# Patient Record
Sex: MALE | Race: BLACK OR AFRICAN AMERICAN | Employment: FULL TIME | URBAN - METROPOLITAN AREA
[De-identification: names, ages, dates, MRNs, and addresses within clinical notes are randomized per-mention and may not be internally consistent; named-entity substitution may affect disease eponyms.]

---

## 2019-10-14 ENCOUNTER — TELEPHONE (OUTPATIENT)
Dept: UROLOGY | Facility: CLINIC | Age: 34
End: 2019-10-14

## 2019-10-14 NOTE — TELEPHONE ENCOUNTER
[facility] Call Center    Phone Message    May a detailed message be left on voicemail: yes    Reason for Call: Order(s): Other:   Reason for requested: semen analysis  Date needed: asap  Provider name: either leland or mayo      Action Taken: Message routed to:  [facility] ([facility]): alejandra uro

## 2019-10-18 NOTE — TELEPHONE ENCOUNTER
MEDICAL RECORDS REQUEST   [facility]  Urology Clinic  [address] [place] [address]  PHONE: [phone]  Fax: [phone]        FUTURE VISIT INFORMATION                                                   Alon Jaime : 1985 scheduled for future visit at [facility]    APPOINTMENT INFORMATION:    Date: 19 8:40AM     Provider:  Valente Willson MD     Reason for Visit/Diagnosis: Infertility     REFERRAL INFORMATION:    Referring provider: Self    Specialty: N/A    Referring providers clinic: N/A     Clinic contact number:  N/A    RECORDS REQUESTED FOR VISIT                                                     NOTES  STATUS/DETAILS   OFFICE NOTE from referring provider  no   OFFICE NOTE from other specialist  no   DISCHARGE SUMMARY from hospital  no   DISCHARGE REPORT from the ER  no   OPERATIVE REPORT  no   MEDICATION LIST  no   INFERTILITY     ALBUMIN  no   FSH  no   LAST UROLOGY/OB GYN VISIT NOTE  no   LH  no   SEMEN ANALYSIS (LAST 2)  in process - Order in [facility], to be brenda    SHBG  no   T  no     PRE-VISIT CHECKLIST      Record collection complete Yes- All recs in [facility] to be [facility]    Appointment appropriately scheduled           (right time/right provider) Yes   MyChart activation If no, please explain: In process    Questionnaire complete If no, please explain: In process      Completed by: Ashleigh Douglass

## 2019-11-12 ENCOUNTER — PRE VISIT (OUTPATIENT)
Dept: UROLOGY | Facility: CLINIC | Age: 34
End: 2019-11-12

## 2019-11-12 DIAGNOSIS — Z31.41 FERTILITY TESTING: Primary | ICD-10-CM

## 2019-11-12 NOTE — TELEPHONE ENCOUNTER
Unable to reach patient to discuss obtaining SA. LVM for patient to call clinic back to discuss.  Daiana Ruelas LPN

## 2019-11-18 ENCOUNTER — TELEPHONE (OUTPATIENT)
Dept: UROLOGY | Facility: CLINIC | Age: 34
End: 2019-11-18

## 2019-11-18 NOTE — TELEPHONE ENCOUNTER
[facility] Call Center    Phone Message    May a detailed message be left on voicemail: yes    Reason for Call: Other: Alon returning call. Please call him back.      Action Taken: Message routed to:  [facility] alejandra thao

## 2019-11-19 DIAGNOSIS — Z31.41 FERTILITY TESTING: ICD-10-CM

## 2019-11-19 PROCEDURE — 89322 SEMEN ANAL STRICT CRITERIA: CPT

## 2019-11-21 LAB
ABNORMAL SPERM: 96 MORPHOLOGY
ABSTINENCE DAYS: 3 DAYS (ref 2–7)
AGGLUTINATION: NO YES/NO
ANALYSIS TEMP - CENTIGRADE: 23 CENTIGRADE
CELL FRAGMENTS: ABNORMAL %
COLLECTION METHOD: ABNORMAL
COLLECTION SITE: ABNORMAL
CONSENT TO RELEASE TO PARTNER: YES
HEAD DEFECT: 97
IMMATURE SPERM: ABNORMAL %
IMMOTILE: 32 %
LAB RECEIPT TIME: ABNORMAL
LIQUEFIED: YES YES/NO
MIDPIECE DEFECT: 49
NON-PROGRESSIVE MOTILITY: 4 %
NORMAL SPERM: 4 % NORMAL FORMS (ref 4–?)
PROGRESSIVE MOTILITY: 64 % (ref 32–?)
ROUND CELLS: 0.1 MILLION/ML (ref ?–2)
SPECIMEN CONCENTRATION: 8 MILLION/ML (ref 15–?)
SPECIMEN PH: 7.2 PH (ref 7.2–?)
SPECIMEN TYPE: ABNORMAL
SPECIMEN VOL UR: 1.6 ML (ref 1.5–?)
TAIL DEFECT: 10
TIME OF ANALYSIS: ABNORMAL
TOTAL NUMBER: 13 MILLION (ref 39–?)
TOTAL PROGRESSIVE MOTILE: 8.3 MILLION (ref 15.6–?)
VISCOUS: NO YES/NO
VITALITY: ABNORMAL % (ref 58–?)
WBC SPECIMEN: ABNORMAL %

## 2019-11-22 ENCOUNTER — PRE VISIT (OUTPATIENT)
Dept: UROLOGY | Facility: CLINIC | Age: 34
End: 2019-11-22

## 2019-12-16 ENCOUNTER — PRE VISIT (OUTPATIENT)
Dept: UROLOGY | Facility: CLINIC | Age: 34
End: 2019-12-16

## 2019-12-16 PROBLEM — E78.5 DYSLIPIDEMIA: Status: ACTIVE | Noted: 2017-12-14

## 2019-12-16 NOTE — TELEPHONE ENCOUNTER
Patient coming in for fertility consult with Dr. Willson. SA in system. Patient chart reviewed, no need for call, all records available and ready for appointment.    Daiana Ruelas LPN  12/16/19  12:36 PM

## 2019-12-30 ENCOUNTER — TELEPHONE (OUTPATIENT)
Dept: UROLOGY | Facility: CLINIC | Age: 34
End: 2019-12-30

## 2019-12-30 NOTE — TELEPHONE ENCOUNTER
[facility] Call [facility]    Phone Message    May a detailed message be left on voicemail: yes    Reason for Call: Other: Pt called and would like to speak with a nurse to go over the SA results. Please call back pt. Thanks.     Action Taken: Message routed to:  [facility] URO

## 2019-12-30 NOTE — TELEPHONE ENCOUNTER
Patient wanted his sa results  He has never been seen here needs to keep his appt on Friday Berenice John LPN Staff Nurse

## 2020-01-03 ENCOUNTER — APPOINTMENT (OUTPATIENT)
Dept: LAB | Facility: CLINIC | Age: 35
End: 2020-01-03
Payer: COMMERCIAL

## 2020-01-03 ENCOUNTER — OFFICE VISIT (OUTPATIENT)
Dept: UROLOGY | Facility: CLINIC | Age: 35
End: 2020-01-03
Payer: COMMERCIAL

## 2020-01-03 VITALS
DIASTOLIC BLOOD PRESSURE: 79 MMHG | SYSTOLIC BLOOD PRESSURE: 129 MMHG | WEIGHT: 210 LBS | HEIGHT: 69 IN | HEART RATE: 70 BPM | BODY MASS INDEX: 31.1 KG/M2

## 2020-01-03 DIAGNOSIS — N46.11 OLIGOSPERMIA: Primary | ICD-10-CM

## 2020-01-03 LAB — FSH SERPL-ACNC: 5.1 IU/L (ref 0.7–10.8)

## 2020-01-03 ASSESSMENT — PAIN SCALES - GENERAL: PAINLEVEL: NO PAIN (0)

## 2020-01-03 ASSESSMENT — MIFFLIN-ST. JEOR: SCORE: 1892.93

## 2020-01-03 NOTE — PROGRESS NOTES
Dear Dr. Mckeon, it was my pleasure to see Mr. Alon Jaime, a 34 year old male here in consultation today for fertility evaluation.  His spouse is Mario Jaime age 29 (5/23/90).    This couple has been attempting to conceive for the last 18 months+. They have no previous pregnancy together.  Pregnancies with other partners: none.  They have tried timed intercourse. They have not tried IUI or IVF.    Female factors suspected: None have been found.  Cycles are occasionally  irregular.    PAST MEDICAL HISTORY:    No chronic medical problems   Puberty normal   No associated conditions such as ED or sexual dysfunction.  No  problems.     PAST SURG HISTORY  History reviewed. No pertinent surgical history.     Medications as of 1/3/2020:  No prescription medications     ALLERGY:     Allergies   Allergen Reactions     Ciprofloxacin Nausea       SOCIAL HISTORY:  . Occupation: [employer].  No alcohol abuse,  tobacco use.   Social History     Tobacco Use     Smoking status: Never Smoker     Smokeless tobacco: Never Used   Substance Use Topics     Alcohol use: Never     Drug use: No         FAMILY HISTORY:  inherited disorders. .   Family History   Problem Relation Age of Onset     Arthritis Maternal Grandmother      Diabetes Maternal Grandmother      Hypertension Maternal Grandmother      Diabetes Paternal Grandmother      Hypertension Paternal Grandmother        REVIEW OF SYSTEMS:  Significant for feeling well at present .    Denies erectile dysfunction, ejaculatory problems, testicular pain. No vision or smell deficits, no chronic sinus or respiratory infections. No recent febrile illness, weight loss. No heat or cold intolerance, gynecomastia, or other endocrine complaints.    Otherwise, no constitutional, eye, ENT, heart, lung, GI , musculoskeletal, skin, neurologic, psychiatric, or hematologic complaints.    GONADOTOXIN EXPOSURE: Unremarkable. Otherwise negative for marijuana, heat, chemicals,  "pesticides, heavy metals, steroids, chemotherapy or radiation.    GENERAL PHYSICAL EXAM  /79   Pulse 70   Ht 1.753 m (5' 9\")   Wt 95.3 kg (210 lb)   BMI 31.01 kg/m     Constitutional: No acute distress. Well nourished.   PSYCH: normal mood and affect.  NEURO: normal gait, no focal deficits.   EYES: anicteric, EOMI, PERR  ENT: neck supple,  mucosae moist, no thrush.  CARDIOPULMONARY: breathing non-labored, pulse regular, no peripheral edema.  GI: Abdomen soft, non-tender, no surgical scars, no organomegaly.  MUSCULOSKELETAL: normal limb proportions, no muscle wasting, no contractures.  SKIN: Normal virilized hair distribution, no lesions, warts or rashes over genitalia, abdomen extremities or face.  HEME/LYMPH: no ecchymosis, no lymphadenopathy in groin or neck, no lymphedema.     EXAM:  Phallus circumcised, meatus adequate, no plaques palpated.   Left testis descended , size is 8-10 cc , consistency is nl. No intra-testicular masses.   Right testis descended , size is 8-10 cc , consistency is normal. No intra-testicular masses.   Epididymes present, non-tender, not enlarged.   Left cord: Vas present. No varicocele noted.  Right cord: Vas present. No varicocele noted.     Rectal exam deferred.     Component      Latest Ref Rng & Units 11/19/2019   Collection Method       Masturbation   Collection Site       MARCELLO   Specimen Type       Semen   Lab Receipt Time       01:45 PM   Time of Analysis       01:50 PM   Analysis Temp - Centigrade      centigrade 23   Abstinence days      2 - 7 days 3   Liquefied      yes/no Yes   Viscous      yes/no No   Agglutination      yes/no No   pH      7.2 pH 7.2   Volume      1.5 ml 1.6   Concentration      15 million/ml 8.0 (A)   Total Number      39 million 13 (A)   Progressive motility      32 % 64   Non-progressive motility      % 4   Immotile      % 32   Total Progressive Motile      15.6 million 8.3 (A)   Vitality      58 % .   Normal Sperm      4 % normal forms 4 "   Abnormal Sperm      morphology 96   Head Defect       97   Midpiece Defect       49   Tail Defect       10   Round Cells      2 million/ml 0.1   WBC      % .   Immature Sperm      % .   Cell Fragments      % .   Consent to Release to Partner       Yes       ASSESSMENT:    Fertility Testing.    Testicular hypofunction - oligospermia.  bilaterally atrophic testes- this correlates with lower sperm concentration.  He says he did miss a portion of the ejaculate on the SA from Nov.    No varicocele noted    PLAN:    Hormonal panel ordered today.    Repeat SA at his convenience    Discussed possible assisted reproductive technology options they could pursue with female fertility specialist.    Discussed OTC supplements to consider taking    I will contact him with results and plan/options.      Thank-you for allowing me to care for your patient.  Sincerely,    Valente Willson MD      CC: Nancy Mckeon

## 2020-01-03 NOTE — NURSING NOTE
"Chief Complaint   Patient presents with     Consult     Fertility discussion       Blood pressure 129/79, pulse 70, height 1.753 m (5' 9\"), weight 95.3 kg (210 lb). Body mass index is 31.01 kg/m .    Patient Active Problem List   Diagnosis     Dysuria     UTI (urinary tract infection)     Dyslipidemia       Allergies   Allergen Reactions     Ciprofloxacin Nausea       Current Outpatient Medications   Medication Sig Dispense Refill     NO ACTIVE MEDICATIONS          Social History     Tobacco Use     Smoking status: Never Smoker     Smokeless tobacco: Never Used   Substance Use Topics     Alcohol use: Yes     Drug use: No       Daiana Ruelas LPN  1/3/2020  8:46 AM  "

## 2020-01-03 NOTE — LETTER
January 17, 2020       TO: Alon Jaime  [address]  [place] [address]       Dear ,    We are writing to inform you of your test results.    Your test results fall within the expected range(s) or remain unchanged from previous results.  Please continue with current treatment plan.    Resulted Orders   Testosterone Free and Total   Result Value Ref Range    Testosterone Total 455 240 - 950 ng/dL      Comment:      This test was developed and its performance characteristics determined by the   [facility],  Special Chemistry Laboratory. It has   not been cleared or approved by the FDA. The laboratory is regulated under   CLIA as qualified to perform high-complexity testing. This test is used for   clinical purposes. It should not be regarded as investigational or for   research.      Sex Hormone Binding Globulin 30 11 - 80 nmol/L    Free Testosterone Calculated 9.94 4.7 - 24.4 ng/dL   Follicle stimulating hormone   Result Value Ref Range    FSH 5.1 0.7 - 10.8 IU/L   Estradiol ultrasensitive   Result Value Ref Range    Estradiol Ultrasensitive 38 10 - 40 pg/mL      Comment:      Reference Ranges  Prepubertal Males:  0-13 pg/mL  Adult Males:  10-40 pg/mL  This test was developed and its performance characteristics determined by the   [facility].   It has not been cleared or approved by the FDA. The laboratory is regulated   under CLIA as qualified to perform high-complexity testing. This test is used   for clinical purposes. It should not be regarded as investigational or for   research.         Valente Willson MD

## 2020-01-03 NOTE — LETTER
1/3/2020       RE: Alon Jaime  [address]  [place] [address]     Dear Colleague,    Thank you for referring your patient, Alon Jaime, to the [facility] UROLOGY AND INST FOR PROSTATE AND UROLOGIC CANCERS at [facility]. Please see a copy of my visit note below.    Dear Dr. Mckeon, it was my pleasure to see . Alon Jaime, a 34 year old male here in consultation today for fertility evaluation.  His spouse is Mario Jaime age 29 (5/23/90).    This couple has been attempting to conceive for the last 18 months+. They have no previous pregnancy together.  Pregnancies with other partners: none.  They have tried timed intercourse. They have not tried IUI or IVF.    Female factors suspected: None have been found.  Cycles are occasionally  irregular.    PAST MEDICAL HISTORY:    No chronic medical problems   Puberty normal   No associated conditions such as ED or sexual dysfunction.  No  problems.     PAST SURG HISTORY  History reviewed. No pertinent surgical history.     Medications as of 1/3/2020:  No prescription medications     ALLERGY:     Allergies   Allergen Reactions     Ciprofloxacin Nausea       SOCIAL HISTORY:  . Occupation: [employer].  No alcohol abuse,  tobacco use.   Social History     Tobacco Use     Smoking status: Never Smoker     Smokeless tobacco: Never Used   Substance Use Topics     Alcohol use: Never     Drug use: No         FAMILY HISTORY:  inherited disorders. .   Family History   Problem Relation Age of Onset     Arthritis Maternal Grandmother      Diabetes Maternal Grandmother      Hypertension Maternal Grandmother      Diabetes Paternal Grandmother      Hypertension Paternal Grandmother        REVIEW OF SYSTEMS:  Significant for feeling well at present .    Denies erectile dysfunction, ejaculatory problems, testicular pain. No vision or smell deficits, no chronic sinus or respiratory infections. No recent febrile illness,  "weight loss. No heat or cold intolerance, gynecomastia, or other endocrine complaints.    Otherwise, no constitutional, eye, ENT, heart, lung, GI , musculoskeletal, skin, neurologic, psychiatric, or hematologic complaints.    GONADOTOXIN EXPOSURE: Unremarkable. Otherwise negative for marijuana, heat, chemicals, pesticides, heavy metals, steroids, chemotherapy or radiation.    GENERAL PHYSICAL EXAM  /79   Pulse 70   Ht 1.753 m (5' 9\")   Wt 95.3 kg (210 lb)   BMI 31.01 kg/m      Constitutional: No acute distress. Well nourished.   PSYCH: normal mood and affect.  NEURO: normal gait, no focal deficits.   EYES: anicteric, EOMI, PERR  ENT: neck supple,  mucosae moist, no thrush.  CARDIOPULMONARY: breathing non-labored, pulse regular, no peripheral edema.  GI: Abdomen soft, non-tender, no surgical scars, no organomegaly.  MUSCULOSKELETAL: normal limb proportions, no muscle wasting, no contractures.  SKIN: Normal virilized hair distribution, no lesions, warts or rashes over genitalia, abdomen extremities or face.  HEME/LYMPH: no ecchymosis, no lymphadenopathy in groin or neck, no lymphedema.     EXAM:  Phallus circumcised, meatus adequate, no plaques palpated.   Left testis descended , size is 8-10 cc , consistency is nl. No intra-testicular masses.   Right testis descended , size is 8-10 cc , consistency is normal. No intra-testicular masses.   Epididymes present, non-tender, not enlarged.   Left cord: Vas present. No varicocele noted.  Right cord: Vas present. No varicocele noted.     Rectal exam deferred.     Component      Latest Ref Rng & Units 11/19/2019   Collection Method       Masturbation   Collection Site       MARCELLO   Specimen Type       Semen   Lab Receipt Time       01:45 PM   Time of Analysis       01:50 PM   Analysis Temp - Centigrade      centigrade 23   Abstinence days      2 - 7 days 3   Liquefied      yes/no Yes   Viscous      yes/no No   Agglutination      yes/no No   pH      7.2 pH 7.2 "   Volume      1.5 ml 1.6   Concentration      15 million/ml 8.0 (A)   Total Number      39 million 13 (A)   Progressive motility      32 % 64   Non-progressive motility      % 4   Immotile      % 32   Total Progressive Motile      15.6 million 8.3 (A)   Vitality      58 % .   Normal Sperm      4 % normal forms 4   Abnormal Sperm      morphology 96   Head Defect       97   Midpiece Defect       49   Tail Defect       10   Round Cells      2 million/ml 0.1   WBC      % .   Immature Sperm      % .   Cell Fragments      % .   Consent to Release to Partner       Yes       ASSESSMENT:    Fertility Testing.    Testicular hypofunction - oligospermia.  bilaterally atrophic testes- this correlates with lower sperm concentration.  He says he did miss a portion of the ejaculate on the SA from Nov.    No varicocele noted    PLAN:    Hormonal panel ordered today.    Repeat SA at his convenience    Discussed possible assisted reproductive technology options they could pursue with female fertility specialist.    Discussed OTC supplements to consider taking    I will contact him with results and plan/options.      Thank-you for allowing me to care for your patient.  Sincerely,    Valente Willson MD      CC: Nancy Mckeon    Again, thank you for allowing me to participate in the care of your patient.      Sincerely,    Valente Willson MD

## 2020-01-06 LAB
SHBG SERPL-SCNC: 30 NMOL/L (ref 11–80)
TESTOST FREE SERPL-MCNC: 9.94 NG/DL (ref 4.7–24.4)
TESTOST SERPL-MCNC: 455 NG/DL (ref 240–950)

## 2020-01-07 LAB — ESTRADIOL SERPL HS-MCNC: 38 PG/ML (ref 10–40)

## 2020-02-07 DIAGNOSIS — N46.11 OLIGOSPERMIA: ICD-10-CM

## 2020-02-07 LAB
ABNORMAL SPERM: 95 MORPHOLOGY
ABSTINENCE DAYS: 3 DAYS (ref 2–7)
AGGLUTINATION: NO YES/NO
ANALYSIS TEMP - CENTIGRADE: 22 CENTIGRADE
CELL FRAGMENTS: ABNORMAL %
COLLECTION METHOD: ABNORMAL
COLLECTION SITE: ABNORMAL
CONSENT TO RELEASE TO PARTNER: YES
HEAD DEFECT: 96
IMMATURE SPERM: ABNORMAL %
IMMOTILE: 35 %
LAB RECEIPT TIME: ABNORMAL
LIQUEFIED: YES YES/NO
MIDPIECE DEFECT: 52
NON-PROGRESSIVE MOTILITY: 2 %
NORMAL SPERM: 5 % NORMAL FORMS (ref 4–?)
PROGRESSIVE MOTILITY: 63 % (ref 32–?)
ROUND CELLS: 0.1 MILLION/ML (ref ?–2)
SPECIMEN CONCENTRATION: 11 MILLION/ML (ref 15–?)
SPECIMEN PH: 7.6 PH (ref 7.2–?)
SPECIMEN TYPE: ABNORMAL
SPECIMEN VOL UR: 1.6 ML (ref 1.5–?)
TAIL DEFECT: 7
TIME OF ANALYSIS: ABNORMAL
TOTAL NUMBER: 18 MILLION (ref 39–?)
TOTAL PROGRESSIVE MOTILE: 11 MILLION (ref 15.6–?)
VISCOUS: NO YES/NO
VITALITY: ABNORMAL % (ref 58–?)
WBC SPECIMEN: ABNORMAL %

## 2020-02-07 PROCEDURE — 89322 SEMEN ANAL STRICT CRITERIA: CPT

## 2020-02-18 DIAGNOSIS — N46.11 OLIGOSPERMIA: Primary | ICD-10-CM

## 2021-03-10 DIAGNOSIS — Z31.41 FERTILITY TESTING: Primary | ICD-10-CM

## 2021-03-10 RX ORDER — CLOMIPHENE CITRATE 50 MG/1
25 TABLET ORAL
Qty: 20 TABLET | Refills: 1 | Status: SHIPPED | OUTPATIENT
Start: 2021-03-10 | End: 2021-10-12

## 2021-04-11 ENCOUNTER — HEALTH MAINTENANCE LETTER (OUTPATIENT)
Age: 36
End: 2021-04-11

## 2021-04-16 ENCOUNTER — TELEPHONE (OUTPATIENT)
Dept: UROLOGY | Facility: CLINIC | Age: 36
End: 2021-04-16

## 2021-04-16 DIAGNOSIS — Z31.41 FERTILITY TESTING: Primary | ICD-10-CM

## 2021-04-16 PROCEDURE — 99207 PR NO CHARGE LOS: CPT

## 2021-04-16 NOTE — TELEPHONE ENCOUNTER
[facility] Call Center    Phone Message    May a detailed message be left on voicemail: yes     Reason for Call: Medication Refill Request    Has the patient contacted the pharmacy for the refill? Yes   Name of medication being requested: clomiPHENE (CLOMID) 50 MG tablet  Provider who prescribed the medication: Dr. Willson  Pharmacy: [id number] [place], [place] - [address] AT [facility] [address]  Date medication is needed: 4/19/2021     appt has been scheduled for 6/24    Action Taken: Message routed to:  [facility] refill team    Travel Screening: Not Applicable

## 2021-04-16 NOTE — TELEPHONE ENCOUNTER
Centralized Medication Refill Team note:  clomiPHENE (CLOMID) 50 MG tablet  Take 0.5 tablets (25 mg) by mouth three times a week Take Monday, Wednesday, Friday only      Last Written Prescription Date:  3/10/21  Last Fill Quantity: 20 tablets,   # refills: 1  Sent to [id number] - [place], [place] - [address] AT [facility] OF [facility] [address]  Last Office Visit : 1/3/2020  Future Office visit:  6/24/21     Left message for pharmacy confirming refill available as ordered / verbal order review of above written order sent 3/10/[phone]  [facility] message to patient.

## 2021-06-10 ENCOUNTER — PRE VISIT (OUTPATIENT)
Dept: UROLOGY | Facility: CLINIC | Age: 36
End: 2021-06-10

## 2021-06-10 NOTE — TELEPHONE ENCOUNTER
Reason for visit: Follow up     Relevant information: hx of oligospermia    Records/imaging/labs/orders: in Epic; needs repeat SA prior to appointment    Pt called: no; will call 1 week prior if patient hasn't gotten semen analysis    At Rooming: normal

## 2021-06-24 ENCOUNTER — APPOINTMENT (OUTPATIENT)
Dept: LAB | Facility: CLINIC | Age: 36
End: 2021-06-24
Payer: COMMERCIAL

## 2021-06-24 ENCOUNTER — OFFICE VISIT (OUTPATIENT)
Dept: UROLOGY | Facility: CLINIC | Age: 36
End: 2021-06-24
Payer: COMMERCIAL

## 2021-06-24 VITALS
DIASTOLIC BLOOD PRESSURE: 84 MMHG | WEIGHT: 207 LBS | BODY MASS INDEX: 30.66 KG/M2 | HEIGHT: 69 IN | SYSTOLIC BLOOD PRESSURE: 132 MMHG | HEART RATE: 70 BPM

## 2021-06-24 DIAGNOSIS — Z31.41 FERTILITY TESTING: Primary | ICD-10-CM

## 2021-06-24 LAB
FSH SERPL-ACNC: 11.2 IU/L (ref 0.7–10.8)
HCT VFR BLD AUTO: 46.1 % (ref 40–53)
HGB BLD-MCNC: 15 G/DL (ref 13.3–17.7)

## 2021-06-24 PROCEDURE — 99214 OFFICE O/P EST MOD 30 MIN: CPT | Performed by: UROLOGY

## 2021-06-24 PROCEDURE — 85018 HEMOGLOBIN: CPT | Performed by: PATHOLOGY

## 2021-06-24 PROCEDURE — 84403 ASSAY OF TOTAL TESTOSTERONE: CPT | Mod: 90 | Performed by: PATHOLOGY

## 2021-06-24 PROCEDURE — 36415 COLL VENOUS BLD VENIPUNCTURE: CPT | Performed by: PATHOLOGY

## 2021-06-24 PROCEDURE — 83001 ASSAY OF GONADOTROPIN (FSH): CPT | Performed by: PATHOLOGY

## 2021-06-24 PROCEDURE — 84270 ASSAY OF SEX HORMONE GLOBUL: CPT | Mod: 90 | Performed by: PATHOLOGY

## 2021-06-24 PROCEDURE — 99000 SPECIMEN HANDLING OFFICE-LAB: CPT | Performed by: PATHOLOGY

## 2021-06-24 PROCEDURE — 85014 HEMATOCRIT: CPT | Performed by: PATHOLOGY

## 2021-06-24 ASSESSMENT — PAIN SCALES - GENERAL: PAINLEVEL: NO PAIN (0)

## 2021-06-24 ASSESSMENT — MIFFLIN-ST. JEOR: SCORE: 1869.33

## 2021-06-24 NOTE — LETTER
"6/24/2021       RE: Alon Jaime  [address]  [place] [address]     Dear Colleague,    Thank you for referring your patient, Alon Jaime, to the [facility]OLOG[facility]IC [place] at [facility]. Please see a copy of my visit note below.    HPI:  Alon Jaime is a 36 year old male being seen for  Follow-up fertility.  History of oligospermia.    Exam:  Physical Exam  /84   Pulse 70   Ht 1.753 m (5' 9\")   Wt 93.9 kg (207 lb)   BMI 30.57 kg/m      General: Alert, oriented, nad.  Pleasant and conversant.  Eyes: anicteric, EOMI.  Pulse: regular  Resps: normal, non-labored.  Abdomen:  Nondistended.  Neurological - no tremors  Skin - no discoloration/ lesions noted   exam deferred.      Review of Imaging:  The following imaging exams were independently viewed and interpreted by me and discussed with patient:  None     Review of Labs:  The following labs were reviewed by me and discussed with the patient:  Component      Latest Ref Rng & Units 11/19/2019 1/3/2020 2/7/2020   Collection Method       Masturbation  Masturbation   Collection Site       MARCELLO  MARCELLO   Specimen Type       Semen  Semen   Lab Receipt Time       01:45 PM  09:05 AM   Time of Analysis       01:50 PM  09:20 AM   Analysis Temp - Centigrade      centigrade 23  22   Abstinence days      2 - 7 days 3  3   Liquefied      yes/no Yes  Yes   Viscous      yes/no No  No   Agglutination      yes/no No  No   pH      7.2 pH 7.2  7.6   Volume      1.5 ml 1.6  1.6   Concentration      15 million/ml 8.0 (A)  11 (A)   Total Number      39 million 13 (A)  18 (A)   Progressive motility      32 % 64  63   Non-progressive motility      % 4  2   Immotile      % 32  35   Total Progressive Motile      15.6 million 8.3 (A)  11.0 (A)   Vitality      58 % .  .   Normal Sperm      4 % normal forms 4  5   Abnormal Sperm      morphology 96  95   Head Defect       97  96   Midpiece Defect       49  52 "   Tail Defect       10  7   Round Cells      2 million/ml 0.1  0.1   WBC      % .  .   Immature Sperm      % .  .   Cell Fragments      % .  .   Consent to Release to Partner       Yes  Yes   Testosterone Total      240 - 950 ng/dL  455    Sex Hormone Binding Globulin      11 - 80 nmol/L  30    Free Testosterone Calculated      4.7 - 24.4 ng/dL  9.94    FSH      0.7 - 10.8 IU/L  5.1    Estradiol Ultrasensitive      10 - 40 pg/mL  38        Assessment & Plan     Fertility testing- has been on Clomid a year 25mg Monday, Wednesday, Friday, but no follow-up yet.  Plan labs today, semen analysis at his convenience, and I will send him results on [facility].    - Testosterone Free and Total  - Follicle stimulating hormone  - Hemoglobin and hematocrit  - Semen Analysis, Strict Morphology (MARCELLO); Future      Valente Willson MD  [facility]OLOGY CLINIC [place]      ==========================      Additional Coding Information:    Problems:  3 -- one stable chronic illness    Data Reviewed  Review of the result(s) of each unique test - SA x 2, blood labs as above.    Level of risk:  4 -- prescription drug management    Time spent:  10 minutes spent on the date of the encounter doing chart review, history and exam, documentation and further activities per the note      Valente Willson MD

## 2021-06-24 NOTE — PROGRESS NOTES
"HPI:  Alon Jaime is a 36 year old male being seen for  Follow-up fertility.  History of oligospermia.    Exam:  Physical Exam  /84   Pulse 70   Ht 1.753 m (5' 9\")   Wt 93.9 kg (207 lb)   BMI 30.57 kg/m      General: Alert, oriented, nad.  Pleasant and conversant.  Eyes: anicteric, EOMI.  Pulse: regular  Resps: normal, non-labored.  Abdomen:  Nondistended.  Neurological - no tremors  Skin - no discoloration/ lesions noted   exam deferred.      Review of Imaging:  The following imaging exams were independently viewed and interpreted by me and discussed with patient:  None     Review of Labs:  The following labs were reviewed by me and discussed with the patient:  Component      Latest Ref Rng & Units 11/19/2019 1/3/2020 2/7/2020   Collection Method       Masturbation  Masturbation   Collection Site       MARCELLO ARMENDARIZ   Specimen Type       Semen  Semen   Lab Receipt Time       01:45 PM  09:05 AM   Time of Analysis       01:50 PM  09:20 AM   Analysis Temp - Centigrade      centigrade 23  22   Abstinence days      2 - 7 days 3  3   Liquefied      yes/no Yes  Yes   Viscous      yes/no No  No   Agglutination      yes/no No  No   pH      7.2 pH 7.2  7.6   Volume      1.5 ml 1.6  1.6   Concentration      15 million/ml 8.0 (A)  11 (A)   Total Number      39 million 13 (A)  18 (A)   Progressive motility      32 % 64  63   Non-progressive motility      % 4  2   Immotile      % 32  35   Total Progressive Motile      15.6 million 8.3 (A)  11.0 (A)   Vitality      58 % .  .   Normal Sperm      4 % normal forms 4  5   Abnormal Sperm      morphology 96  95   Head Defect       97  96   Midpiece Defect       49  52   Tail Defect       10  7   Round Cells      2 million/ml 0.1  0.1   WBC      % .  .   Immature Sperm      % .  .   Cell Fragments      % .  .   Consent to Release to Partner       Yes  Yes   Testosterone Total      240 - 950 ng/dL  455    Sex Hormone Binding Globulin      11 - 80 nmol/L  30    Free Testosterone " Calculated      4.7 - 24.4 ng/dL  9.94    FSH      0.7 - 10.8 IU/L  5.1    Estradiol Ultrasensitive      10 - 40 pg/mL  38        Assessment & Plan     Fertility testing- has been on Clomid a year 25mg Monday, Wednesday, Friday, but no follow-up yet.  Plan labs today, semen analysis at his convenience, and I will send him results on [place].    - Testosterone Free and Total  - Follicle stimulating hormone  - Hemoglobin and hematocrit  - Semen Analysis, Strict Morphology (MARCELLO); Future      Valente Willson MD  [facility] UROLOGY CLINIC [place]      ==========================      Additional Coding Information:    Problems:  3 -- one stable chronic illness    Data Reviewed  Review of the result(s) of each unique test - SA x 2, blood labs as above.    Level of risk:  4 -- prescription drug management    Time spent:  10 minutes spent on the date of the encounter doing chart review, history and exam, documentation and further activities per the note

## 2021-06-24 NOTE — NURSING NOTE
"Chief Complaint   Patient presents with     Follow Up     oligospermia       Height 1.753 m (5' 9\"), weight 93.9 kg (207 lb). Body mass index is 30.57 kg/m .    Patient Active Problem List   Diagnosis     Dysuria     UTI (urinary tract infection)     Dyslipidemia       Allergies   Allergen Reactions     Ciprofloxacin Nausea       Current Outpatient Medications   Medication Sig Dispense Refill     clomiPHENE (CLOMID) 50 MG tablet Take 0.5 tablets (25 mg) by mouth three times a week Take Monday, Wednesday, Friday only. 20 tablet 1     NO ACTIVE MEDICATIONS          Social History     Tobacco Use     Smoking status: Never Smoker     Smokeless tobacco: Never Used   Substance Use Topics     Alcohol use: Never     Drug use: No       Jim Tadeo EMT  6/24/2021  7:32 AM  "

## 2021-06-25 LAB
SHBG SERPL-SCNC: 35 NMOL/L (ref 11–80)
TESTOST FREE SERPL-MCNC: 23.66 NG/DL (ref 4.7–24.4)
TESTOST SERPL-MCNC: 1010 NG/DL (ref 240–950)

## 2021-07-04 NOTE — RESULT ENCOUNTER NOTE
Dear Alon,     Here are your recent results.     Labs tests look good on Clomid 25mg Monday, Wednesday, Friday.  We expect to see higher testosterone and FSH on this medication.    Next step is to schedule a semen analysis at your convenience.  [facility] [phone] to schedule.    I will send a [employer] msg when results are back.      Please let us know if you have any questions.    Constanza MARR

## 2021-07-28 ENCOUNTER — LAB (OUTPATIENT)
Dept: LAB | Facility: CLINIC | Age: 36
End: 2021-07-28
Attending: UROLOGY
Payer: COMMERCIAL

## 2021-07-28 DIAGNOSIS — Z31.41 FERTILITY TESTING: ICD-10-CM

## 2021-07-28 PROCEDURE — 89322 SEMEN ANAL STRICT CRITERIA: CPT

## 2021-07-29 LAB
ABNORMAL SPERM MORPHOLOGY: 97
ABSTINENCE DAYS: 7 DAYS (ref 2–7)
AGGLUTINATION: NO
ANALYSIS TEMP - CENTIGRADE: 23.5 CENTIGRADE
COLLECTION METHOD: ABNORMAL
COLLECTION SITE: ABNORMAL
CONSENT TO RELEASE TO PARTNER: YES
DAL- RECEIVED TIME: ABNORMAL
HEAD DEFECT: 97 %
IMMOTILE: 31 %
LIQUEFIED: YES
MIDPIECE DEFECT: 60 %
NON-PROGRESSIVE MOTILITY: 2 %
NORMAL SPERM MORPHOLOGY: 3 % NORMAL FORMS
PROGRESSIVE MOTILITY: 67 %
ROUND CELLS: 0.2 MILLION/ML
SPECIMEN PH: 7.2 PH
SPECIMEN VOLUME: 4 ML
SPERM CONCENTRATION: 28 MILLION/ML
TAIL DEFECT: 21 %
TIME OF ANALYSIS: ABNORMAL
TOTAL PROGRESSIVE MOTILE NUMBER: 75 MILLION
TOTAL SPERM NUMBER: 112 MILLION
VISCOUS: NO
VITALITY: ABNORMAL

## 2021-07-31 NOTE — RESULT ENCOUNTER NOTE
Hi Max,    Can you please send the patient these normal results?  Semen analysis much improved on Clomid.    Continue Clomid current dose.  If not pregnant is 6 months, follow-up with T, FSH, hematocrit/hemoglobin, semen analysis and see me.    Thanks,    Constanza MARR

## 2021-09-25 ENCOUNTER — HEALTH MAINTENANCE LETTER (OUTPATIENT)
Age: 36
End: 2021-09-25

## 2021-10-08 DIAGNOSIS — Z31.41 FERTILITY TESTING: ICD-10-CM

## 2021-10-12 RX ORDER — CLOMIPHENE CITRATE 50 MG/1
TABLET ORAL
Qty: 20 TABLET | Refills: 1 | Status: SHIPPED | OUTPATIENT
Start: 2021-10-12

## 2021-10-12 NOTE — TELEPHONE ENCOUNTER
clomiPHENE (CLOMID) 50 MG   Last Written Prescription Date:  3/10/21  Last Fill Quantity: 20,   # refills: 1  Last Office Visit : 6/24/21  Future Office visit:  NONE  Routing refill request to provider for review/approval because:  Drug not on the refill protocol

## 2022-05-07 ENCOUNTER — HEALTH MAINTENANCE LETTER (OUTPATIENT)
Age: 37
End: 2022-05-07

## 2022-11-10 ENCOUNTER — MYC MEDICAL ADVICE (OUTPATIENT)
Dept: UROLOGY | Facility: CLINIC | Age: 37
End: 2022-11-10

## 2022-11-12 ENCOUNTER — TRANSFERRED RECORDS (OUTPATIENT)
Dept: HEALTH INFORMATION MANAGEMENT | Facility: CLINIC | Age: 37
End: 2022-11-12

## 2022-11-14 ENCOUNTER — TELEPHONE (OUTPATIENT)
Dept: UROLOGY | Facility: CLINIC | Age: 37
End: 2022-11-14

## 2022-11-14 DIAGNOSIS — E29.1 TESTICULAR HYPOFUNCTION: Primary | ICD-10-CM

## 2022-11-14 RX ORDER — CLOMIPHENE CITRATE 50 MG/1
TABLET ORAL
Qty: 12 TABLET | Refills: 4 | Status: SHIPPED | OUTPATIENT
Start: 2022-11-14 | End: 2022-11-14

## 2022-11-14 NOTE — TELEPHONE ENCOUNTER
Sent [employer] message to the patient to let him know what the plan and medication information.  Labs and medication ordered    Claudette LARA RN, BSN  Care Coordinator

## 2022-11-14 NOTE — TELEPHONE ENCOUNTER
----- Message from Valente Willson MD sent at 11/12/2022 10:29 AM CST -----  Regarding: FW: semen analysis follow up question  Can you put prescription for Clomid 25mg Monday, Wednesday, Friday for him.  Follow-up appointment with testosterone, FSH, hematocrit/hemoglobin in a month with me please.    Thank You    Constanza MARR      ----- Message -----  From: Louisa Thompson DO  Sent: 11/11/2022   4:45 PM CST  To: Valente Willson MD  Subject: semen analysis follow up question                Hi there,  I am seeing Mario and Alon, they are going through femara cycles with planned IUI.   I had Alon repeat the SA as it had been over a year since he was treated with clomid by you, and I see his SA improved back then. However, in Oct it was back to what it had been, perhaps worse even, before the clomid. I will get the report scanned in but can include some of the abnormal highlights:  Count 14.8  Morphology 0  Concentration 5.9  Total motile sperm 7.4  Total progressive motile 6.4    Any thoughts to direct next steps for Alon?  We are doing a femara/IUI cycle right now, so things are in motion currently, but I am thinking ahead to next cycle perhaps we need to make some changes.    Thanks!    Louisa Thompson DO  Ob/Gyn  [facility]

## 2023-01-18 RX ORDER — CLOMIPHENE CITRATE 50 MG/1
TABLET ORAL
Qty: 12 TABLET | Refills: 4 | Status: SHIPPED | OUTPATIENT
Start: 2023-01-18

## 2023-04-22 ENCOUNTER — HEALTH MAINTENANCE LETTER (OUTPATIENT)
Age: 38
End: 2023-04-22

## 2023-06-02 ENCOUNTER — HEALTH MAINTENANCE LETTER (OUTPATIENT)
Age: 38
End: 2023-06-02

## 2024-06-29 ENCOUNTER — HEALTH MAINTENANCE LETTER (OUTPATIENT)
Age: 39
End: 2024-06-29

## 2025-05-08 ENCOUNTER — LAB (OUTPATIENT)
Dept: LAB | Facility: CLINIC | Age: 40
End: 2025-05-08
Payer: COMMERCIAL

## 2025-05-08 DIAGNOSIS — N46.9 MALE INFERTILITY, UNSPECIFIED: ICD-10-CM

## 2025-05-08 PROCEDURE — 89322 SEMEN ANAL STRICT CRITERIA: CPT

## 2025-05-09 LAB
ABNORMAL SPERM MORPHOLOGY: 98
ABSTINENCE DAYS: 3 DAYS (ref 2–7)
AGGLUTINATION: NO
ANALYSIS TEMP - CENTIGRADE: 22 CENTIGRADE
COLLECTION METHOD: ABNORMAL
COLLECTION SITE: ABNORMAL
CONSENT TO RELEASE TO PARTNER: YES
DAL- RECEIVED TIME: ABNORMAL
HEAD DEFECT: 98 %
IMMOTILE: 25 %
LIQUEFIED: YES
MIDPIECE DEFECT: 38 %
NON-PROGRESSIVE MOTILITY: 3 %
NORMAL SPERM MORPHOLOGY: 2 % NORMAL FORMS
PROGRESSIVE MOTILITY: 72 %
ROUND CELLS: <0.1 MILLION/ML
SPECIMEN PH: 7.2 PH
SPECIMEN VOLUME: 1.7 ML
SPERM CONCENTRATION: 34.5 MILLION/ML
TAIL DEFECT: 9 %
TIME OF ANALYSIS: ABNORMAL
TOTAL PROGRESSIVE MOTILE NUMBER: 42 MILLION
TOTAL SPERM NUMBER: 59 MILLION
VISCOUS: NO
VITALITY: ABNORMAL